# Patient Record
Sex: MALE | Race: WHITE | NOT HISPANIC OR LATINO | URBAN - METROPOLITAN AREA
[De-identification: names, ages, dates, MRNs, and addresses within clinical notes are randomized per-mention and may not be internally consistent; named-entity substitution may affect disease eponyms.]

---

## 2019-12-21 ENCOUNTER — EMERGENCY (EMERGENCY)
Facility: HOSPITAL | Age: 39
LOS: 1 days | Discharge: HOME | End: 2019-12-21
Admitting: EMERGENCY MEDICINE
Payer: COMMERCIAL

## 2019-12-21 VITALS
HEIGHT: 76 IN | OXYGEN SATURATION: 98 % | RESPIRATION RATE: 18 BRPM | HEART RATE: 86 BPM | WEIGHT: 175.93 LBS | DIASTOLIC BLOOD PRESSURE: 89 MMHG | TEMPERATURE: 98 F | SYSTOLIC BLOOD PRESSURE: 131 MMHG

## 2019-12-21 PROCEDURE — 93971 EXTREMITY STUDY: CPT | Mod: 26,RT

## 2019-12-21 PROCEDURE — 99284 EMERGENCY DEPT VISIT MOD MDM: CPT

## 2019-12-21 NOTE — ED PROVIDER NOTE - PATIENT PORTAL LINK FT
You can access the FollowMyHealth Patient Portal offered by [facility] by registering at the following website: [email or web address]. By joining [facility]’s FollowMyHealth portal, you will also be able to view your health information using other applications (apps) compatible with our system.

## 2019-12-21 NOTE — ED PROVIDER NOTE - CLINICAL SUMMARY MEDICAL DECISION MAKING FREE TEXT BOX
lower back pain chronic no neuro/motor deficits, unremarkable physical exam to legs, ambulatory. US duplex neg for DVT, has MRI L spine scheduled for tomorrow. return precautions discussed.

## 2019-12-21 NOTE — ED PROVIDER NOTE - NSFOLLOWUPINSTRUCTIONS_ED_ALL_ED_FT
Please follow up with your doctor     Return to the Emergency Department for any concerns or worsening symptoms including worsening pain, numbness, weakness, or any concerns.

## 2019-12-21 NOTE — ED ADULT NURSE NOTE - NSIMPLEMENTINTERV_GEN_ALL_ED
Implemented All Universal Safety Interventions:  [place] to call system. Call bell, personal items and telephone within reach. Instruct patient to call for assistance. Room bathroom lighting operational. Non-slip footwear when patient is off stretcher. Physically safe environment: no spills, clutter or unnecessary equipment. Stretcher in lowest position, wheels locked, appropriate side rails in place.

## 2019-12-21 NOTE — ED ADULT NURSE NOTE - OBJECTIVE STATEMENT
39y male presents to ED c/o 39y male presents to ED c/o right leg numbness. Pt endorses lower back injury two years ago and again a month ago with pain that radiates down right leg. Pt states his right leg "feels numb and colder than my left." Dorsal pedal pulses strong, equal bilaterally. No noted palpable temperature difference between the two legs. A&Ox3.

## 2019-12-21 NOTE — ED PROVIDER NOTE - OBJECTIVE STATEMENT
40 yo male with chronic back pain, under care of pain management, attends physical therapy sessions regularly c/o exacerbation of lower back pain with radiation to pain to posterior right pain x few days after he reached down to grab an item. came into ED today as he felt like the right calf may be colder than the left calf, does not feel cold anymore. denies trauma, fall, numbness, weakness. no urinary or bowel incontinence. no saddle anesthesia. has scheduled MRI LS spine tomorrow. ambulatory with no c

## 2019-12-21 NOTE — ED ADULT TRIAGE NOTE - CHIEF COMPLAINT QUOTE
Patient to ED with complaint of right foot and calf numbness.  States that it is also cold to the touch.  No acute distress

## 2019-12-21 NOTE — ED PROVIDER NOTE - PHYSICAL EXAMINATION
CONSTITUTIONAL: Well-developed; well-nourished; in no acute distress.  	SKIN: Skin is warm and dry, no acute rash.  	HEAD: Normocephalic; atraumatic.  	EYES: PERRL, EOM intact; conjunctiva and sclera clear.  	ENT: No nasal discharge; airway clear.  no tonsillar swelling or exudates, uvula midline, airway patent  	NECK: Supple; non tender.  	CARD: S1, S2 normal; no murmurs, gallops, or rubs. Regular rate and rhythm.  	RESP: No wheezes, rales or rhonchi.  	ABD:  soft; non-distended; non-tender  Back: normal inspection, no midline tenderness or bony stepoffs  Bilateral lower extremities: normal color and temperature, distal pulses intact bilaterally. no calf swelling or tenderness, good cap refill, good strength 5/5 x 2. ambulatory. soft compartments.   	EXT: Normal ROM. No clubbing, cyanosis or edema.  	NEURO: Alert, oriented. Grossly unremarkable.  PSYCH: Cooperative, appropriate.

## 2019-12-27 DIAGNOSIS — R20.0 ANESTHESIA OF SKIN: ICD-10-CM

## 2019-12-27 DIAGNOSIS — M54.5 LOW BACK PAIN: ICD-10-CM

## 2021-08-10 NOTE — ED ADULT NURSE NOTE - CAS TRG GEN SKIN COLOR
Psychiatric Progress Note      DATA:  Subjective/Behavioral Description:  Patient verbalizes: Patient is starting to feel better.  He is sleeping still poorly he ended up taking Seroquel and the trazodone 100 and he woke up feeling so sluggish this morning    Mental Status Evaluation:   Moods are more stable depression better again you can retry with the low-dose of trazodone as a backup if he needs that.    He is tolerating medications well he has settled down his moods are better his eye contact is better    MEDICATIONS:  Current Facility-Administered Medications   Medication Dose Route Frequency Provider Last Rate Last Admin   • traZODone (DESYREL) tablet 50 mg  50 mg Oral Nightly PRN KRISTAL Gonzalez MD       • QUEtiapine (SEROquel) tablet 200 mg  200 mg Oral Nightly KRISTAL Gonzalez MD   200 mg at 08/09/21 2120   • busPIRone (BUSPAR) tablet 30 mg  30 mg Oral 2 times per day Yunier Haynes MD   30 mg at 08/10/21 0845   • nalTREXone (REVIA) tablet 50 mg  50 mg Oral Daily Yunier Haynes MD   50 mg at 08/10/21 0845   • benztropine mesylate (COGENTIN) 1 MG/ML injection 1 mg  1 mg Intramuscular Q4H PRN Yunier Haynes MD        Or   • benztropine (COGENTIN) tablet 1 mg  1 mg Oral Q4H PRN Yunier Haynes MD       • hydrOXYzine (ATARAX) tablet 50 mg  50 mg Oral Q4H PRN Yunier Haynes MD       • LORazepam (ATIVAN) injection 1 mg  1 mg Intramuscular Q4H PRN Yunier Haynes MD        Or   • LORazepam (ATIVAN) tablet 1 mg  1 mg Oral Q4H PRN Yunier Haynes MD   1 mg at 08/06/21 1846   • haloperidol lactate (HALDOL) 5 MG/ML injection 5 mg  5 mg Intramuscular Q4H PRN Yunier Haynes MD        Or   • haloperidol (HALDOL) tablet 5 mg  5 mg Oral Q4H PRN Yunier Haynes MD       • acetaminophen (TYLENOL) tablet 650 mg  650 mg Oral Q4H PRN Yunier Haynes MD       • polyethylene glycol (MIRALAX) packet 17 g  17 g Oral Daily PRN Yunier Haynes MD       • aluminum-magnesium hydroxide-simethicone (MAALOX) 200-200-20 MG/5ML  suspension 20 mL  20 mL Oral Q4H PRN Yunier Haynes MD       • ondansetron (ZOFRAN ODT) disintegrating tablet 4 mg  4 mg Oral BID PRN Yunier Haynes MD       • nicotine (NICODERM) 21 MG/24HR patch 1 patch  1 patch Transdermal Daily Yunier Haynes MD   1 patch at 08/08/21 0836   • docusate sodium-sennosides (SENOKOT S) 50-8.6 MG 2 tablet  2 tablet Oral Daily PRN Ivory Duke MD       • albuterol (VENTOLIN) nebulizer 2.5 mg  2.5 mg Nebulization Q4H Resp PRN Ivory Duke MD       • buPROPion XL (WELLBUTRIN XL) tablet 300 mg  300 mg Oral Daily Ivory Duke MD   300 mg at 08/10/21 0845   • pantoprazole (PROTONIX) EC tablet 40 mg  40 mg Oral QAM AC Ivory Duke MD   40 mg at 08/10/21 0845       LABS:    Admission on 08/05/2021   Component Date Value Ref Range Status   • Hemoglobin A1C 08/04/2021 5.5  4.5 - 5.6 % Final      Diabetic Screening  Non Diabetic:             <5.7%  Increased Risk:           5.7-6.4%  Diagnostic For Diabetes:  >6.4%    Diabetic Control  A1C%       eAG mg/dL  6.0            126  6.5            140  7.0            154  7.5            169  8.0            183  8.5            197  9.0            212  9.5            226  10.0           240   • TSH 08/03/2021 1.310  0.350 - 5.000 mcUnits/mL Final    Findings most consistent with euthyroid state, no additional testing suggested. TSH may be normal in patients with thyroid dysfunction and pituitary disease. Clinical correlation recommended.    (Reflex TSH algorithm is not recommended in hospitalized patients. A variety of drugs, as well as serious acute and chronic illnesses may alter thyroid function tests. Commonly implicated drugs include glucocorticoids, dopamine, carbamazepine, iodine, amiodarone, lithium and heparin.)   • Cholesterol 08/04/2021 161  <=199 mg/dL Final    Desirable         <200  Borderline High   200 to 239  High              >=240   • Triglycerides 08/04/2021 203* <=149 mg/dL Final    Normal             <150  Borderline High   150 to 199  High              200 to 499  Very High         >=500   • HDL 08/04/2021 39* >=40 mg/dL Final    Low              <40  Borderline Low   40 to 49  Near Optimal     50 to 59  Optimal          >=60   • LDL 08/04/2021 81  <=129 mg/dL Final    OPTIMAL           <100  NEAR OPTIMAL      100 to 129  BORDERLINE HIGH   130 to 159  HIGH              160 to 189  VERY HIGH         >=190   • Non-HDL Cholesterol 08/04/2021 122  mg/dL Final    Therapeutic Target:  CHD and risk equivalents  <130  Multiple risk factors     <160  0 to 1 risk factor        <190   • Cholesterol/ HDL Ratio 08/04/2021 4.1  <=4.4 Final       Problem List Items Addressed This Visit        Mental Health    Bipolar affective disorder, currently depressed, moderate (CMS/HCC) - Primary     Patient is admitted inpatient psychiatry unit, currently on duloxetine, lurasidone, buspirone, mirtazapine,          Relevant Medications    QUEtiapine (SEROquel) tablet 200 mg    traZODone (DESYREL) tablet 50 mg          PLAN:   A. Behavioral: present care plan .  Inpatient    C. Disposition: DC plan.  Friday.  Needs a family meeting tomorrow and, with the discharge plan    B. Medication: Continue medication cut down the trazodone to 50       Medication change rationale:      So is not so sleepy normal     Normal for race